# Patient Record
Sex: FEMALE | Race: ASIAN
[De-identification: names, ages, dates, MRNs, and addresses within clinical notes are randomized per-mention and may not be internally consistent; named-entity substitution may affect disease eponyms.]

---

## 2018-04-03 ENCOUNTER — HOSPITAL ENCOUNTER (OUTPATIENT)
Dept: HOSPITAL 45 - C.MAMM | Age: 23
Discharge: HOME | End: 2018-04-03
Attending: PHYSICIAN ASSISTANT
Payer: COMMERCIAL

## 2018-04-03 DIAGNOSIS — N64.4: Primary | ICD-10-CM

## 2018-04-04 NOTE — MAMMOGRAPHY REPORT
ULTRASOUND OF LEFT BREAST: 4/3/2018

CLINICAL HISTORY: 22-year-old woman presents with chronic pain in the superior left breast from the a
xillary region towards the nipple in the 12:00 and 1:00 axes.  She reports the pain has been present 
for 1-2 years but is becoming worse.  Possible area of thickening in the left upper outer quadrant.  
No definite mass, skin erythema or nipple discharge.  Family history of breast cancer = mother.  





COMPARISON: No prior exams were available for comparison.   



FINDINGS: On palpation, there is no discrete mass or obvious area of thickening in the 12:00 or 1:00 
axes of the left breast from the nipple through axilla.  Targeted ultrasound in the 11:00, 12:00, 1:0
0 and 2:00 axes demonstrates sonographically normal tissue.  No suspicious solid or cystic mass is id
entified.  No focal skin thickening or drainable fluid collection.



IMPRESSION: ACR BI-RADS CATEGORY 2: BENIGN 

There is no sonographic evidence of malignancy or other suspicious abnormality in the superior left b
reast to explain the pain described by the patient.  Therefore, clinical follow-up is recommended, as
 biopsy of a clinically suspicious lesion should not be precluded by negative imaging.



These results and recommendations were discussed with the patient at the time of the exam.



April Graham M.D.  

ay/:4/3/2018 13:22:41  



Imaging Technologist: Viviana BOTELLO)([email or web address]), [facility]

letter sent: Normal 1/2  

BI-RADS Code: ACR BI-RADS Category 2: Benign